# Patient Record
(demographics unavailable — no encounter records)

---

## 2025-04-21 NOTE — ASSESSMENT
[FreeTextEntry1] : The patient is a 55-year-old G3, P3 postmenopausal white female of Ashkenazi [place] descent. She underwent menarche at age 11 and had her first child at age 28. She has a history of prior bilateral excisional biopsies in her 30s which were benign. She has a strong family history with her mother who had cancer 3 times initially at age 66 and tested BRCA negative in 2008 and later had [facility] genetic panel testing in 2018 which was negative. Her paternal cousin passed away from a brain tumor at age 58. 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 Her Carline model lifetime risk of breast cancer is 27.4%.  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  She underwent her last bilateral breast MRI which was reviewed from [place] and performed on November 30, 2022 which showed no suspicious findings. Her last bilateral mammography and ultrasound was reviewed from [place] and performed today on April 21, 2025 which showed no suspicious findings in either breast. On exam today, I cannot feel any suspicious densities in either breast.  The patient was reassured and she can follow-up in 1 year and her next bilateral mammography and ultrasound will be due again in April 2026 and she was given prescriptions.  I would like her to restart routine screening MRIs but I would like to stagger her next MRI in October 2026 and she was given a prescription.  The patient tells me she is now postmenopausal and had questions about hormone replacement therapy and I told her she could use estrogen creams for any vaginal symptoms.  I am still not recommending progesterone however in patients who have a uterus and I am still discouraging systemic combination therapy

## 2025-04-21 NOTE — PHYSICAL EXAM
[Normocephalic] : normocephalic [Atraumatic] : atraumatic [EOMI] : extra ocular movement intact [Supple] : supple [No Supraclavicular Adenopathy] : no supraclavicular adenopathy [No Cervical Adenopathy] : no cervical adenopathy [Examined in the supine and seated position] : examined in the supine and seated position [No dominant masses] : no dominant masses in right breast  [No dominant masses] : no dominant masses left breast [No Nipple Retraction] : no left nipple retraction [No Nipple Discharge] : no left nipple discharge [Breast Mass Right Breast ___cm] : no masses [Breast Mass Left Breast ___cm] : no masses [No Axillary Lymphadenopathy] : no left axillary lymphadenopathy [No Edema] : no edema [No Rashes] : no rashes [No Ulceration] : no ulceration [Breast Nipple Inversion] : nipples not inverted [Breast Nipple Retraction] : nipples not retracted [Breast Nipple Flattening] : nipples not flattened [Breast Nipple Fissures] : nipples not fissured [Breast Abnormal Lactation (Galactorrhea)] : no galactorrhea [Breast Abnormal Secretion Bloody Fluid] : no bloody discharge [Breast Abnormal Secretion Serous Fluid] : no serous discharge [Breast Abnormal Secretion Opalescent Fluid] : no milky discharge [de-identified] : On exam, the patient has moderately ptotic B-cup breasts.  On palpation I cannot feel any suspicious densities in either breast.  She has no axillary, supraclavicular, or cervical adenopathy.

## 2025-04-21 NOTE — HISTORY OF PRESENT ILLNESS
[FreeTextEntry1] : The patient is a 55-year-old G3, P3 postmenopausal white female of Ashkenazi Lutheran descent.  She underwent menarche at age 11 and had her first child at age 28.  She has a history of prior bilateral excisional biopsies in her 30s which were benign.  She has a strong family history with her mother who had cancer 3 times initially at age 66 and tested BRCA negative in 2008 and later had [facility] genetic panel testing in 2018 which was negative.  Her paternal cousin passed away from a brain tumor at age 58.  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  Her Carline model lifetime risk of breast cancer is 27.4%.  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  She comes in for routine follow-up and has been getting yearly mammography and ultrasound and intermittent MRIs.

## 2025-04-21 NOTE — HISTORY OF PRESENT ILLNESS
[FreeTextEntry1] : The patient is a 55-year-old G3, P3 postmenopausal white female of Ashkenazi Scientologist descent.  She underwent menarche at age 11 and had her first child at age 28.  She has a history of prior bilateral excisional biopsies in her 30s which were benign.  She has a strong family history with her mother who had cancer 3 times initially at age 66 and tested BRCA negative in 2008 and later had [facility] genetic panel testing in 2018 which was negative.  Her paternal cousin passed away from a brain tumor at age 58.  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  Her Carline model lifetime risk of breast cancer is 27.4%.  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  She comes in for routine follow-up and has been getting yearly mammography and ultrasound and intermittent MRIs.

## 2025-04-21 NOTE — ASSESSMENT
[FreeTextEntry1] : The patient is a 55-year-old G3, P3 postmenopausal white female of Ashkenazi Yazidism descent. She underwent menarche at age 11 and had her first child at age 28. She has a history of prior bilateral excisional biopsies in her 30s which were benign. She has a strong family history with her mother who had cancer 3 times initially at age 66 and tested BRCA negative in 2008 and later had [facility] genetic panel testing in 2018 which was negative. Her paternal cousin passed away from a brain tumor at age 58. 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 Her Carline model lifetime risk of breast cancer is 27.4%.  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  She underwent her last bilateral breast MRI which was reviewed from [place] and performed on November 30, 2022 which showed no suspicious findings. Her last bilateral mammography and ultrasound was reviewed from [place] and performed today on April 21, 2025 which showed no suspicious findings in either breast. On exam today, I cannot feel any suspicious densities in either breast.  The patient was reassured and she can follow-up in 1 year and her next bilateral mammography and ultrasound will be due again in April 2026 and she was given prescriptions.  I would like her to restart routine screening MRIs but I would like to stagger her next MRI in October 2026 and she was given a prescription.  The patient tells me she is now postmenopausal and had questions about hormone replacement therapy and I told her she could use estrogen creams for any vaginal symptoms.  I am still not recommending progesterone however in patients who have a uterus and I am still discouraging systemic combination therapy

## 2025-04-21 NOTE — ADDENDUM
[FreeTextEntry1] : I spent greater than 75% of the consultation in face-to-face counseling and coordination of care in this patient with a strong family history of breast cancer and Ashkenazi Ca[employer]ism heritage who has fibrocystic mastopathy and comes in for breast cancer screening/surveillance.

## 2025-04-21 NOTE — REASON FOR VISIT
[Follow-Up: _____] : a [unfilled] follow-up visit [FreeTextEntry1] : The patient comes in with a strong family history of breast cancer and Ashkenazi Sabianism ancestry with a history of some fibrocystic mastopathy and an increased Carline model risk.

## 2025-04-21 NOTE — PHYSICAL EXAM
[Normocephalic] : normocephalic [Atraumatic] : atraumatic [EOMI] : extra ocular movement intact [Supple] : supple [No Supraclavicular Adenopathy] : no supraclavicular adenopathy [No Cervical Adenopathy] : no cervical adenopathy [Examined in the supine and seated position] : examined in the supine and seated position [No dominant masses] : no dominant masses in right breast  [No dominant masses] : no dominant masses left breast [No Nipple Retraction] : no left nipple retraction [No Nipple Discharge] : no left nipple discharge [Breast Mass Right Breast ___cm] : no masses [Breast Mass Left Breast ___cm] : no masses [No Axillary Lymphadenopathy] : no left axillary lymphadenopathy [No Edema] : no edema [No Rashes] : no rashes [No Ulceration] : no ulceration [Breast Nipple Inversion] : nipples not inverted [Breast Nipple Retraction] : nipples not retracted [Breast Nipple Flattening] : nipples not flattened [Breast Nipple Fissures] : nipples not fissured [Breast Abnormal Lactation (Galactorrhea)] : no galactorrhea [Breast Abnormal Secretion Bloody Fluid] : no bloody discharge [Breast Abnormal Secretion Serous Fluid] : no serous discharge [Breast Abnormal Secretion Opalescent Fluid] : no milky discharge [de-identified] : On exam, the patient has moderately ptotic B-cup breasts.  On palpation I cannot feel any suspicious densities in either breast.  She has no axillary, supraclavicular, or cervical adenopathy.

## 2025-04-21 NOTE — REASON FOR VISIT
[Follow-Up: _____] : a [unfilled] follow-up visit [FreeTextEntry1] : The patient comes in with a strong family history of breast cancer and Ashkenazi [place] ancestry with a history of some fibrocystic mastopathy and an increased Carline model risk.

## 2025-04-21 NOTE — ADDENDUM
[FreeTextEntry1] : I spent greater than 75% of the consultation in face-to-face counseling and coordination of care in this patient with a strong family history of breast cancer and Ashkenazi Adventist heritage who has fibrocystic mastopathy and comes in for breast cancer screening/surveillance.